# Patient Record
Sex: MALE | Race: WHITE | NOT HISPANIC OR LATINO | Employment: STUDENT | URBAN - METROPOLITAN AREA
[De-identification: names, ages, dates, MRNs, and addresses within clinical notes are randomized per-mention and may not be internally consistent; named-entity substitution may affect disease eponyms.]

---
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
## 2025-02-27 ENCOUNTER — OFFICE VISIT (OUTPATIENT)
Dept: FAMILY MEDICINE CLINIC | Facility: CLINIC | Age: 20
End: 2025-02-27
Payer: COMMERCIAL

## 2025-02-27 VITALS
WEIGHT: 159 LBS | DIASTOLIC BLOOD PRESSURE: 61 MMHG | SYSTOLIC BLOOD PRESSURE: 107 MMHG | OXYGEN SATURATION: 98 % | TEMPERATURE: 98.3 F | BODY MASS INDEX: 21.54 KG/M2 | HEART RATE: 74 BPM | HEIGHT: 72 IN

## 2025-02-27 DIAGNOSIS — Z13.220 SCREENING FOR CHOLESTEROL LEVEL: ICD-10-CM

## 2025-02-27 DIAGNOSIS — J02.9 SORE THROAT: ICD-10-CM

## 2025-02-27 DIAGNOSIS — Z11.59 NEED FOR HEPATITIS C SCREENING TEST: ICD-10-CM

## 2025-02-27 DIAGNOSIS — R53.83 OTHER FATIGUE: ICD-10-CM

## 2025-02-27 DIAGNOSIS — J02.0 STREP PHARYNGITIS: ICD-10-CM

## 2025-02-27 DIAGNOSIS — Z13.29 SCREENING FOR THYROID DISORDER: ICD-10-CM

## 2025-02-27 DIAGNOSIS — Z00.00 ANNUAL PHYSICAL EXAM: Primary | ICD-10-CM

## 2025-02-27 DIAGNOSIS — R50.9 FEVER, UNSPECIFIED FEVER CAUSE: ICD-10-CM

## 2025-02-27 DIAGNOSIS — R05.1 ACUTE COUGH: ICD-10-CM

## 2025-02-27 LAB
EXPIRATION DATE: ABNORMAL
EXPIRATION DATE: NORMAL
FLUAV AG UPPER RESP QL IA.RAPID: NOT DETECTED
FLUBV AG UPPER RESP QL IA.RAPID: NOT DETECTED
INTERNAL CONTROL: ABNORMAL
INTERNAL CONTROL: NORMAL
Lab: ABNORMAL
Lab: NORMAL
S PYO AG THROAT QL: POSITIVE
SARS-COV-2 AG UPPER RESP QL IA.RAPID: NOT DETECTED

## 2025-02-27 PROCEDURE — 99213 OFFICE O/P EST LOW 20 MIN: CPT | Performed by: NURSE PRACTITIONER

## 2025-02-27 PROCEDURE — 87428 SARSCOV & INF VIR A&B AG IA: CPT | Performed by: NURSE PRACTITIONER

## 2025-02-27 PROCEDURE — 99395 PREV VISIT EST AGE 18-39: CPT | Performed by: NURSE PRACTITIONER

## 2025-02-27 PROCEDURE — 87880 STREP A ASSAY W/OPTIC: CPT | Performed by: NURSE PRACTITIONER

## 2025-02-27 NOTE — PROGRESS NOTES
"Chief Complaint  Annual Physical, Fever, sore throat and cough    Subjective            Luke Austen Lao presents to [facility]  History of Present Illness  Pt here for an annual CPE and has complaint of fever, sore throat and cough for 2 days. PT reports temp at home from 99.4-101.6. Pt does not have a temp today. He has not taken any tylenol or ibuprofen. Pt was taking nyquil and dayquil with little relief. He would like to be tested for flu, covid and strep. Pt is present with his mother today. PT also c/o chronic fatigue and would like labwork for this to evaluate.    PT is due labs, orders placed.    PT  is due HPV, and flu vaccines, pt declines and understands the risks of not having.        History reviewed. No pertinent past medical history.    No Known Allergies     History reviewed. No pertinent surgical history.     Social History     Tobacco Use    Smoking status: Never     Passive exposure: Never    Smokeless tobacco: Never   Substance Use Topics    Alcohol use: Yes       History reviewed. No pertinent family history.     Current Outpatient Medications on File Prior to Visit   Medication Sig    Adapalene-Benzoyl Peroxide (Epiduo Forte) 0.3-2.5 % gel Apply 1 each topically every night at bedtime.     No current facility-administered medications on file prior to visit.       Health Maintenance Due   Topic Date Due    HEPATITIS C SCREENING  Never done       Objective     /61   Pulse 74   Temp 98.3 °F (36.8 °C)   Ht 182.9 cm (72\")   Wt 72.1 kg (159 lb)   SpO2 98%   BMI 21.56 kg/m²       Physical Exam  Constitutional:       General: He is not in acute distress.     Appearance: Normal appearance. He is not ill-appearing.   HENT:      Head: Normocephalic and atraumatic.      Right Ear: Tympanic membrane, ear canal and external ear normal.      Left Ear: Tympanic membrane, ear canal and external ear normal.      Nose: Nose normal.      Mouth/Throat:      Lips: " Pink.      Mouth: Mucous membranes are moist.      Pharynx: Posterior oropharyngeal erythema present. No pharyngeal swelling, oropharyngeal exudate, uvula swelling or postnasal drip.      Tonsils: No tonsillar exudate or tonsillar abscesses.   Cardiovascular:      Rate and Rhythm: Normal rate and regular rhythm.      Heart sounds: Normal heart sounds. No murmur heard.  Pulmonary:      Effort: Pulmonary effort is normal. No respiratory distress.      Breath sounds: Normal breath sounds.   Chest:      Chest wall: No tenderness.   Abdominal:      General: Abdomen is flat. Bowel sounds are normal. There is no distension.      Palpations: Abdomen is soft. There is no mass.      Tenderness: There is no abdominal tenderness. There is no guarding.   Musculoskeletal:         General: No swelling or tenderness. Normal range of motion.      Cervical back: Normal range of motion and neck supple.   Skin:     General: Skin is warm and dry.      Findings: No rash.   Neurological:      General: No focal deficit present.      Mental Status: He is alert and oriented to person, place, and time. Mental status is at baseline.      Gait: Gait normal.   Psychiatric:         Mood and Affect: Mood normal.         Behavior: Behavior normal.         Thought Content: Thought content normal.         Judgment: Judgment normal.           Result Review :                           Assessment and Plan        Diagnoses and all orders for this visit:    1. Annual physical exam (Primary)  -     CBC w AUTO Differential; Future  -     Comprehensive metabolic panel; Future  -     Lipid panel; Future  -     TSH; Future  -     Hepatitis C antibody; Future    2. Fever, unspecified fever cause  Comments:  advised Tylenol OTC q  8 hrs prn  Orders:  -     POCT SARS-CoV-2 Antigen VICENTE + Flu  -     POC Rapid Strep A  -     amoxicillin-clavulanate (AUGMENTIN) 875-125 MG per tablet; Take 1 tablet by mouth 2 (Two) Times a Day for 10 days.  Dispense: 20 tablet;  Refill: 0    3. Sore throat  -     POCT SARS-CoV-2 Antigen VICENTE + Flu  -     POC Rapid Strep A  -     amoxicillin-clavulanate (AUGMENTIN) 875-125 MG per tablet; Take 1 tablet by mouth 2 (Two) Times a Day for 10 days.  Dispense: 20 tablet; Refill: 0    4. Screening for cholesterol level  -     Comprehensive metabolic panel; Future  -     Lipid panel; Future    5. Screening for thyroid disorder  -     TSH; Future    6. Need for hepatitis C screening test  -     Hepatitis C antibody; Future    7. Acute cough  -     POCT SARS-CoV-2 Antigen VICENTE + Flu    8. Strep pharyngitis  -     amoxicillin-clavulanate (AUGMENTIN) 875-125 MG per tablet; Take 1 tablet by mouth 2 (Two) Times a Day for 10 days.  Dispense: 20 tablet; Refill: 0    9. Other fatigue  -     Vitamin B12 & Folate; Future  -     Iron Profile; Future  -     Vitamin D 25 hydroxy; Future      Preventative Counseling:  Healhty diet  Daily exercise  Get adequate sleep        Follow Up     Return in about 1 year (around 2/27/2026), or if symptoms worsen or fail to improve.    Patient was given instructions and counseling regarding his condition or for health maintenance advice. Please see specific information pulled into the AVS if appropriate.     Charlie Ferris, APRN

## 2025-03-20 ENCOUNTER — TELEPHONE (OUTPATIENT)
Dept: FAMILY MEDICINE CLINIC | Facility: CLINIC | Age: 20
End: 2025-03-20
Payer: COMMERCIAL

## 2025-03-20 NOTE — TELEPHONE ENCOUNTER
Spoke with patient regarding overdue orders.    CBC w AUTO Differential  Comprehensive metabolic panel  Hepatitis C antibody  Iron Profile  Lipid panel  TSH  Vitamin B12 & Folate  Vitamin D 25 hydroxy

## 2025-04-03 ENCOUNTER — OFFICE VISIT (OUTPATIENT)
Dept: FAMILY MEDICINE CLINIC | Facility: CLINIC | Age: 20
End: 2025-04-03
Payer: COMMERCIAL

## 2025-04-03 VITALS
TEMPERATURE: 98.8 F | HEART RATE: 83 BPM | WEIGHT: 158.6 LBS | HEIGHT: 72 IN | DIASTOLIC BLOOD PRESSURE: 62 MMHG | SYSTOLIC BLOOD PRESSURE: 107 MMHG | BODY MASS INDEX: 21.48 KG/M2 | OXYGEN SATURATION: 99 %

## 2025-04-03 DIAGNOSIS — J02.9 SORE THROAT: Primary | ICD-10-CM

## 2025-04-03 DIAGNOSIS — R50.9 FEVER, UNSPECIFIED FEVER CAUSE: ICD-10-CM

## 2025-04-03 DIAGNOSIS — R59.1 LYMPHADENOPATHY: ICD-10-CM

## 2025-04-03 LAB
EXPIRATION DATE: NORMAL
INTERNAL CONTROL: NORMAL
Lab: NORMAL
S PYO AG THROAT QL: NEGATIVE

## 2025-04-03 NOTE — PROGRESS NOTES
Chief Complaint   Patient presents with    Sore Throat     On going since yesterday     Fever     Since yesterday         Subjective     Charlie Lao  has no past medical history on file.    Sore Throat   Associated symptoms include swollen glands.   Fever   Associated symptoms include a sore throat.       History of Present Illness  The patient is a 19-year-old male who presents today with sore throat and fever.    He experienced a fever last night, which peaked at 101.6 degrees Fahrenheit. He did not take any antipyretics such as Tylenol, opting instead to allow the fever to run its course. Concurrently, he developed a sore throat and a headache yesterday. He also reports tenderness in his neck. He has not been in contact with any sick individuals. He had strep infection about 1.5 months ago. He reports that the antibiotics prescribed during his previous illness were effective in alleviating his symptoms.    No Known Allergies      Current Outpatient Medications:     Adapalene-Benzoyl Peroxide (Epiduo Forte) 0.3-2.5 % gel, Apply 1 each topically every night at bedtime. (Patient not taking: Reported on 4/3/2025), Disp: 45 g, Rfl: 1    There is no problem list on file for this patient.       History reviewed. No pertinent surgical history.    Social History     Socioeconomic History    Marital status: Single   Tobacco Use    Smoking status: Never     Passive exposure: Never    Smokeless tobacco: Never   Vaping Use    Vaping status: Never Used   Substance and Sexual Activity    Alcohol use: Yes    Drug use: Defer    Sexual activity: Defer       History reviewed. No pertinent family history.    Family history, surgical history, past medical history, Allergies and med's reviewed with patient today and updated in [facility] EMR.     ROS:  Review of Systems   Constitutional:  Positive for fever.   HENT:  Positive for sore throat and swollen glands.    Respiratory: Negative.     Cardiovascular: Negative.   "      OBJECTIVE:  Vitals:    04/03/25 0912   BP: 107/62   Pulse: 83   Temp: 98.8 °F (37.1 °C)   TempSrc: Temporal   SpO2: 99%   Weight: 71.9 kg (158 lb 9.6 oz)   Height: 182.9 cm (72\")     Body mass index is 21.51 kg/m².  No LMP for male patient.    Physical Exam  HENT:      Nose:      Right Sinus: No maxillary sinus tenderness or frontal sinus tenderness.      Left Sinus: No maxillary sinus tenderness or frontal sinus tenderness.      Mouth/Throat:      Pharynx: Uvula midline. Posterior oropharyngeal erythema present.   Cardiovascular:      Rate and Rhythm: Normal rate and regular rhythm.      Pulses: Normal pulses.      Heart sounds: Normal heart sounds.   Pulmonary:      Effort: Pulmonary effort is normal.      Breath sounds: Normal breath sounds.   Lymphadenopathy:      Cervical: Cervical adenopathy present.   Neurological:      General: No focal deficit present.         Physical Exam  The patient's tongue appears slightly red.  Lungs were auscultated.    Vital Signs  Temperature in the office today was 98.8.    Procedures     BMI is within normal parameters. No other follow-up for BMI required.      Health Maintenance Due   Topic Date Due    HPV VACCINES (1 - Male 3-dose series) Never done    MENINGOCOCCAL B VACCINE (1 of 2 - Standard) Never done    HEPATITIS C SCREENING  Never done        No visits with results within 30 Day(s) from this visit.   Latest known visit with results is:   Office Visit on 02/27/2025   Component Date Value Ref Range Status    SARS Antigen 02/27/2025 Not Detected  Not Detected, Presumptive Negative Final    Influenza A Antigen VICENTE 02/27/2025 Not Detected  Not Detected Final    Influenza B Antigen VICENTE 02/27/2025 Not Detected  Not Detected Final    Internal Control 02/27/2025 Passed  Passed Final    Lot Number 02/27/2025 [id number]   Final    Expiration Date 02/27/2025 11/27/25   Final    Rapid Strep A Screen 02/27/2025 Positive (A)  Negative, VALID, INVALID, Not Performed Final    " Internal Control 02/27/2025 Passed  Passed Final    Lot Number 02/27/2025 [id number]   Final    Expiration Date 02/27/2025 2/28/26   Final       Results  Laboratory Studies  Strep test was negative.      ASSESSMENT/ PLAN:    Diagnoses and all orders for this visit:    1. Sore throat (Primary)  -     POCT rapid strep A        Assessment & Plan  1. Pharyngitis.  He reports a sore throat that started yesterday, accompanied by a fever of 101.6°F last night, and a headache. He also has a tender lymph node in his neck. Given the current prevalence of infections and his recent onset of symptoms, it is plausible that he may be suffering from streptococcal pharyngitis, despite a negative test result today. It is just a little early to test. A prescription for antibiotics will be sent to [facility] to address the potential streptococcal infection. He is advised to manage his fever and body aches by alternating between Tylenol and ibuprofen.    Orders Placed Today:     No orders of the defined types were placed in this encounter.       Management Plan:     An After Visit Summary was printed and given to the patient at discharge.    Follow-up: No follow-ups on file.    Patient or patient representative verbalized consent for the use of Ambient Listening during the visit with  SIENNA Menezes for chart documentation. 4/3/2025  09:31 EDT    SIENNA Menezes 4/3/2025 09:14 EDT  This note was electronically signed.